# Patient Record
Sex: MALE | Race: WHITE
[De-identification: names, ages, dates, MRNs, and addresses within clinical notes are randomized per-mention and may not be internally consistent; named-entity substitution may affect disease eponyms.]

---

## 2019-01-20 NOTE — EDPHY
H & P


Stated Complaint: urinary retention


Time Seen by Provider: 01/20/19 21:51


HPI/ROS: 





HPI





CHIEF COMPLAINT:  Dysuria.





HISTORY OF PRESENT ILLNESS:  53-year-old male, history of bipolar disorder, 

alcoholism daily alcohol use, is been recently doing methamphetamine, presents 

to the emergency room stating he is having dysuria.  Patient states that he 

urinated earlier today and had a great deal of discomfort and burning.  He has 

then been holding his urine he was afraid use the bathroom.  He presents 

emergency room by ambulance for dysuria.  Denies any abdominal pain, denies 

back pain, denies fever, denies chest pain or shortness of breath.


He did do methamphetamine earlier today.


He also drank today.


Patient denies STI exposure.





Past Medical History:  Bipolar disorder not on medication





Past Surgical History:  No recent surgery





Social History:   methamphetamine, alcohol, daily alcohol use.  Homeless.





Family History:  Noncontributory








ROS   


REVIEW OF SYSTEMS:


10 Systems were reviewed and negative with the exception of the elements 

mentioned in the history of present illness.








Exam   


Constitutional nontoxic no acute distress  triage nursing summary reviewed, 

vital signs reviewed, awake/alert. 


Eyes   normal conjunctivae and sclera, EOMI, PERRLA. 


HENT   normal inspection, atraumatic, moist mucus membranes, no epistaxis, neck 

supple/ no meningismus, no raccoon eyes. 


Respiratory   clear to auscultation bilaterally, normal breath sounds, no 

respiratory distress, no wheezing. 


Cardiovascular   rate normal, regular rhythm, no murmur, no edema, distal 

pulses normal. 


Gastrointestinal   soft, non-tender, no rebound, no guarding, normal bowel 

sounds, no distension, no pulsatile mass. 


Genitourinary   no CVA tenderness. 


Musculoskeletal  no midline vertebral tenderness, full range of motion, no calf 

swelling, no tenderness of extremities, no meningismus, good pulses, 

neurovascularly intact.


Skin   pink, warm, & dry, no rash, skin atraumatic. 


Neurologic   awake, alert and oriented x 3, AAOx3, moves all 4 extremities 

equally, motor intact, sensory intact, CN II-XII intact, normal cerebellar, 

normal vision, normal speech. 


Psychiatric   normal mood/affect. 


Heme/Lymph/Immune   no lymphadenopathy.





Differential Diagnosis:  Includes but is not limited to in a particular order 

UTI, cystitis, alcohol intoxication, methamphetamine abuse, urinary retention, 

BPH, prostatitis





Medical Decision Making:  Plan for this patient IV establishment basic blood 

draw, IV fluid bolus, check UA.  Re-evaluate





Re-evaluation:





Urinalysis reviewed shows no evidence of infection.  No blood.


Patient re-evaluated at 1:21 a.m. He has been sleeping.  No complaints.  He 

denies any vomiting or abdominal pain chest pain or shortness of breath.





Blood work is reassuring.


Urinalysis reveals no infection





Patient well-hydrated.





Patient's drug screen positive for meth.





Highly recommend the patient refrain from doing methamphetamine.





Patient is able to urinate without any difficulty.





Return precautions discussed.








0133:  Patient screaming and yelling in the emergency room cursing, he is very 

upset that not of his personal belongings were brought with him from the 

shelter.  He is now very angry and threatening, verbally aggressive with staff.

  The police have been called for their assistance.


Source: Patient





- Personal History


Current Tetanus Diphtheria and Acellular Pertussis (TDAP): Unsure





- Medical/Surgical History


Hx Asthma: No


Hx Chronic Respiratory Disease: No


Hx Diabetes: No


Hx Cardiac Disease: No


Hx Renal Disease: No


Hx Cirrhosis: No


Hx Alcoholism: Yes


Hx HIV/AIDS: No


Hx Splenectomy or Spleen Trauma: No


Other PMH: back pain etoh use, meth use





- Social History


Smoking Status: Current every day smoker


Constitutional: 


 Initial Vital Signs











Temperature (C)  36.7 C   01/20/19 21:42


 


Heart Rate  101 H  01/20/19 21:42


 


Respiratory Rate  18   01/20/19 21:42


 


Blood Pressure  121/75 H  01/20/19 21:42


 


O2 Sat (%)  95   01/20/19 21:42








 











O2 Delivery Mode               Room Air














Allergies/Adverse Reactions: 


 





codeine Allergy (Verified 11/03/18 07:48)


 








Home Medications: 














 Medication  Instructions  Recorded


 


NK [No Known Home Meds]  10/30/18














Medical Decision Making





- Data Points


Laboratory Results: 


 Laboratory Results





 01/20/19 22:00 





 01/20/19 22:00 





 











  01/20/19 01/20/19 01/20/19





  22:00 22:00 21:40


 


WBC    11.73 10^3/uL H 10^3/uL  





    (3.80-9.50)  


 


RBC    4.46 10^6/uL 10^6/uL  





    (4.40-6.38)  


 


Hgb    15.3 g/dL g/dL  





    (13.7-17.5)  


 


Hct    43.3 % %  





    (40.0-51.0)  


 


MCV    97.1 fL fL  





    (81.5-99.8)  


 


MCH    34.3 pg H pg  





    (27.9-34.1)  


 


MCHC    35.3 g/dL g/dL  





    (32.4-36.7)  


 


RDW    11.9 % %  





    (11.5-15.2)  


 


Plt Count    318 10^3/uL 10^3/uL  





    (150-400)  


 


MPV    8.7 fL fL  





    (8.7-11.7)  


 


Neut % (Auto)    71.6 % %  





    (39.3-74.2)  


 


Lymph % (Auto)    16.5 % %  





    (15.0-45.0)  


 


Mono % (Auto)    11.2 % %  





    (4.5-13.0)  


 


Eos % (Auto)    0.1 % L %  





    (0.6-7.6)  


 


Baso % (Auto)    0.3 % %  





    (0.3-1.7)  


 


Nucleat RBC Rel Count    0.0 % %  





    (0.0-0.2)  


 


Absolute Neuts (auto)    8.40 10^3/uL H 10^3/uL  





    (1.70-6.50)  


 


Absolute Lymphs (auto)    1.94 10^3/uL 10^3/uL  





    (1.00-3.00)  


 


Absolute Monos (auto)    1.31 10^3/uL H 10^3/uL  





    (0.30-0.80)  


 


Absolute Eos (auto)    0.01 10^3/uL L 10^3/uL  





    (0.03-0.40)  


 


Absolute Basos (auto)    0.04 10^3/uL 10^3/uL  





    (0.02-0.10)  


 


Absolute Nucleated RBC    0.00 10^3/uL 10^3/uL  





    (0-0.01)  


 


Immature Gran %    0.3 % %  





    (0.0-1.1)  


 


Immature Gran #    0.03 10^3/uL 10^3/uL  





    (0.00-0.10)  


 


Sodium  129 mEq/L L mEq/L    





   (135-145)   


 


Potassium  4.1 mEq/L mEq/L    





   (3.5-5.2)   


 


Chloride  99 mEq/L mEq/L    





   ()   


 


Carbon Dioxide  21 mEq/l L mEq/l    





   (22-31)   


 


Anion Gap  9 mEq/L mEq/L    





   (6-14)   


 


BUN  14 mg/dL mg/dL    





   (7-23)   


 


Creatinine  0.6 mg/dL L mg/dL    





   (0.7-1.3)   


 


Estimated GFR  > 60     





    


 


Glucose  100 mg/dL mg/dL    





   ()   


 


Calcium  8.9 mg/dL mg/dL    





   (8.5-10.4)   


 


Urine Color      





    


 


Urine Appearance      





    


 


Urine pH      





    


 


Ur Specific Gravity      





    


 


Urine Protein      





    


 


Urine Ketones      





    


 


Urine Blood      





    


 


Urine Nitrate      





    


 


Urine Bilirubin      





    


 


Urine Urobilinogen      





    


 


Ur Leukocyte Esterase      





    


 


Urine Glucose      





    


 


Urine Opiates Screen      NEGATIVE 





     (NEGATIVE) 


 


Urine Barbiturates      NEGATIVE 





     (NEGATIVE) 


 


Ur Phencyclidine Scrn      NEGATIVE 





     (NEGATIVE) 


 


Ur Amphetamine Screen      NON-NEGATIVE  H 





     (NEGATIVE) 


 


U Benzodiazepines Scrn      NEGATIVE 





     (NEGATIVE) 


 


Urine Cocaine Screen      NEGATIVE 





     (NEGATIVE) 


 


U Marijuana (THC) Screen      NON-NEGATIVE  H 





     (NEGATIVE) 


 


Ethyl Alcohol  < 10 mg/dL mg/dL    





   (0-10)   














  01/20/19





  21:40


 


WBC  





  


 


RBC  





  


 


Hgb  





  


 


Hct  





  


 


MCV  





  


 


MCH  





  


 


MCHC  





  


 


RDW  





  


 


Plt Count  





  


 


MPV  





  


 


Neut % (Auto)  





  


 


Lymph % (Auto)  





  


 


Mono % (Auto)  





  


 


Eos % (Auto)  





  


 


Baso % (Auto)  





  


 


Nucleat RBC Rel Count  





  


 


Absolute Neuts (auto)  





  


 


Absolute Lymphs (auto)  





  


 


Absolute Monos (auto)  





  


 


Absolute Eos (auto)  





  


 


Absolute Basos (auto)  





  


 


Absolute Nucleated RBC  





  


 


Immature Gran %  





  


 


Immature Gran #  





  


 


Sodium  





  


 


Potassium  





  


 


Chloride  





  


 


Carbon Dioxide  





  


 


Anion Gap  





  


 


BUN  





  


 


Creatinine  





  


 


Estimated GFR  





  


 


Glucose  





  


 


Calcium  





  


 


Urine Color  YELLOW 





  


 


Urine Appearance  CLEAR 





  


 


Urine pH  5.0 





   (5.0-7.5) 


 


Ur Specific Gravity  1.011 





   (1.002-1.030) 


 


Urine Protein  NEGATIVE 





   (NEGATIVE) 


 


Urine Ketones  NEGATIVE 





   (NEGATIVE) 


 


Urine Blood  NEGATIVE 





   (NEGATIVE) 


 


Urine Nitrate  NEGATIVE 





   (NEGATIVE) 


 


Urine Bilirubin  NEGATIVE 





   (NEGATIVE) 


 


Urine Urobilinogen  NEGATIVE EU EU





   (0.2-1.0) 


 


Ur Leukocyte Esterase  NEGATIVE 





   (NEGATIVE) 


 


Urine Glucose  NEGATIVE 





   (NEGATIVE) 


 


Urine Opiates Screen  





  


 


Urine Barbiturates  





  


 


Ur Phencyclidine Scrn  





  


 


Ur Amphetamine Screen  





  


 


U Benzodiazepines Scrn  





  


 


Urine Cocaine Screen  





  


 


U Marijuana (THC) Screen  





  


 


Ethyl Alcohol  





  











Medications Given: 


 








Discontinued Medications





Sodium Chloride (Ns)  1,000 mls @ 0 mls/hr IV EDNOW ONE; [facility]


   PRN Reason: Protocol


   Stop: 01/20/19 21:56


   Last Admin: 01/20/19 21:59 Dose:  1,000 mls








Departure





- Departure


Disposition: Home, Routine, Self-Care


Clinical Impression: 


 Methamphetamine abuse





Condition: Good


Instructions:  Methamphetamine Abuse (ED)


Additional Instructions: 


1. Drink lots of fluids stay well-hydrated


2. Return emergency room if worsening symptoms


Referrals: 


NONE *PRIMARY CARE P,. [Primary Care Provider] - As per Instructions

## 2019-01-29 NOTE — EDPHY
H & P


Time Seen by Provider: 01/29/19 21:15


HPI/ROS: 


HPI:  This is a 53-year-old male who presents with





Chief Complaint:  Left-sided groin pain





Location:  Left-sided groin


Quality:  Pain, bulge


Duration:  1 hr prior to arrival


Signs and Symptoms: no fever, no nausea, no vomiting, no hematemesis, no blood 

in stool, no abdominal bloating, no diarrhea, no back pain, no urinary symptoms

, no testicular pain, + left-sided groin pain, no indigestion, no chest pain, 

no shortness of breath


Timing:  Acute, constant


Severity:  Moderate


Context:  Patient presents via EMS with complaints of sudden onset left-sided 

groin pain and bulge he reports that he has hernia that "came out when I was 

coughing."Patient is every day smoker, alcoholism and methamphetamine use.  

Patient was seen on 01/20/2019 complaining of urinary symptoms with negative 

urine.


Modifying Factors:  None





Comment: 








ROS:  A comprehensive 10 system review of systems is otherwise negative aside 

from elements mentioned in the history of present illness. 





MEDICAL/SURGICAL/SOCIAL HISTORY: 


Medical history: back pain, etoh use, meth use


Surgical history:  Denies


Social history:  Current everyday smoker.  Family history noncontributory.








CONSTITUTIONAL:  Labile, untidy, middle-aged male, awake and alert, no obvious 

distress


HEENT: Atraumatic and normocephalic, PERRL, EOMI.  Nares patent; no rhinorrhea;

  no nasal mucosal edema. Tympanic membranes clear. Oropharynx clear, poor 

dentition, no exudate and dry oral mucosa.  Airway patent.  No lymphadenopathy.

  No meningismus.


Cardiovascular: Normal S1/S2, regular rate, regular rhythm, without murmur rub 

or gallop.


PULMONARY/CHEST:  Symmetrical and nontender. Clear to auscultation bilaterally. 

Good air movement. No accessory muscle usage.


ABDOMEN:  Soft, nondistended, nontender, no rebound, no guarding, no peritoneal 

signs, no masses or organomegaly. No CVAT.


Male : circumcised penis, bilateral descended testes, no testicular swelling, 

small inguinal hernia mass appreciated on the left side, no penile discharge, 

no lesions, negative Prehn's sign. 


EXTREMITIES:  2/2 pulses, strength 5/5, no deformities, no clubbing, no 

cyanosis or edema.


NEUROLOGICAL: no focal neuro deficits.  GCS 15.


SKIN: Warm and dry, no erythema.  Tattoos.  no rash.  Good capillary refill. 








Source: Patient, Old records


Exam Limitations: No limitations





- Medical/Surgical History


Hx Asthma: No


Hx Chronic Respiratory Disease: No


Hx Diabetes: No


Hx Cardiac Disease: No


Hx Renal Disease: No


Hx Cirrhosis: No


Hx Alcoholism: Yes


Hx HIV/AIDS: No


Hx Splenectomy or Spleen Trauma: No


Other PMH: back pain etoh use, meth use





- Social History


Smoking Status: Current every day smoker


Constitutional: 


 Initial Vital Signs











Temperature (C)  36.5 C   01/29/19 21:16


 


Heart Rate  106 H  01/29/19 21:16


 


Respiratory Rate  20   01/29/19 21:16


 


Blood Pressure  109/69   01/29/19 21:16


 


O2 Sat (%)  93   01/29/19 21:16








 











O2 Delivery Mode               Room Air














Allergies/Adverse Reactions: 


 





codeine Allergy (Verified 01/29/19 21:16)


 








Home Medications: 














 Medication  Instructions  Recorded


 


NK [No Known Home Meds]  10/30/18














Medical Decision Making


Procedures: 


Procedure:  Hernia reduction.





The left inguinal hernia was reduced using gentle pressure technique without 

complications.  Post reduction the patient's abdominal, groin exam was normal.


The procedure was performed by myself.





ED Course/Re-evaluation: 


Vital signs reviewed and show mild tachycardia.


Patient immediately placed into Trendelenburg position and IV fentanyl 50 mcg 

given.


Left inguinal hernia simple type was easily reduced on 1st attempt.


Written and verbal instructions on self reduction and prevention of hernia 

incarceration.





No signs of neurovascular compromise/tenting of skin/compartment syndrome/

extremities and joints examined above and below area of concern and are 

neurovascularly intact.








This patient was seen under the supervision of my secondary supervising 

physician.  Discussed this patient with Dr. Rouse.   





Differential Diagnosis: 


Differential diagnosis includes but is not limited to incarcerated hernia, 

strangulated hernia, abscess, gonorrhea, chlamydia, urinary tract infection, 

folliculitis.








- Data Points


Medications Given: 


 








Discontinued Medications





Fentanyl (Sublimaze)  50 mcg IVP EDNOW ONE


   Stop: 01/29/19 21:17


   Last Admin: 01/29/19 21:22 Dose:  50 mcg








Departure





- Departure


Disposition: Home, Routine, Self-Care


Clinical Impression: 


 Reducible left inguinal hernia





Condition: Good


Instructions:  Inguinal Hernia (ED), Inguinal Hernia Repair (DC)


Additional Instructions: 


Please do not lift more than 10 lb.


Take Tylenol 650 mg every 4 hours and/or Ibuprofen 600 mg every 8 hours with 

food as needed for pain. 


Attempt to reduce hernia by yourself by lying in Trendelenburg position and 

applying gentle pressure. 


Follow up with the [facility] and/or General surgery to discuss further 

options of hernia repair.


Referrals: 


[facility],. [Clinic] - As per Instructions

## 2019-03-14 ENCOUNTER — HOSPITAL ENCOUNTER (OUTPATIENT)
Dept: HOSPITAL 80 - F3N | Age: 54
Setting detail: OBSERVATION
LOS: 1 days | Discharge: HOME | End: 2019-03-15
Attending: SURGERY | Admitting: SURGERY
Payer: MEDICAID

## 2019-03-14 DIAGNOSIS — Z72.0: ICD-10-CM

## 2019-03-14 DIAGNOSIS — F32.9: ICD-10-CM

## 2019-03-14 DIAGNOSIS — K42.9: ICD-10-CM

## 2019-03-14 DIAGNOSIS — K40.20: Primary | ICD-10-CM

## 2019-03-14 DIAGNOSIS — Z23: ICD-10-CM

## 2019-03-14 PROCEDURE — G0378 HOSPITAL OBSERVATION PER HR: HCPCS

## 2019-03-14 PROCEDURE — 0WQF0ZZ REPAIR ABDOMINAL WALL, OPEN APPROACH: ICD-10-PCS | Performed by: SURGERY

## 2019-03-14 PROCEDURE — G0009 ADMIN PNEUMOCOCCAL VACCINE: HCPCS

## 2019-03-14 PROCEDURE — 90471 IMMUNIZATION ADMIN: CPT

## 2019-03-14 PROCEDURE — 49650 LAP ING HERNIA REPAIR INIT: CPT

## 2019-03-14 PROCEDURE — C1781 MESH (IMPLANTABLE): HCPCS

## 2019-03-14 PROCEDURE — C1727 CATH, BAL TIS DIS, NON-VAS: HCPCS

## 2019-03-14 PROCEDURE — 49585: CPT

## 2019-03-14 PROCEDURE — 0WQF4ZZ REPAIR ABDOMINAL WALL, PERCUTANEOUS ENDOSCOPIC APPROACH: ICD-10-PCS | Performed by: SURGERY

## 2019-03-14 RX ADMIN — HYDROCODONE BITARTRATE AND ACETAMINOPHEN PRN TAB: 5; 325 TABLET ORAL at 22:15

## 2019-03-14 RX ADMIN — HYDROCODONE BITARTRATE AND ACETAMINOPHEN PRN TAB: 5; 325 TABLET ORAL at 16:17

## 2019-03-14 RX ADMIN — KETOROLAC TROMETHAMINE SCH MG: 15 INJECTION, SOLUTION INTRAMUSCULAR; INTRAVENOUS at 17:53

## 2019-03-14 NOTE — SUROPNOTE
[facility] Operative Report





- Surgery





Date of surgery:  3/14/2019





Indications for surgery:


This is a 53-year-old gentleman with increasing large left inguinal hernia 

asymptomatic right and a mildly symptomatic umbilical hernia here for elective 

repair.





Preop diagnosis:  Bilateral inguinal hernia, umbilical hernia


Postop diagnosis:  Same


Procedure:  Bilateral laparoscopic inguinal hernia repair with 3DMax mesh, open 

umbilical hernia repair





Surgeon:  Dr. Foote


Assistant:  Kirti Orosco PA-C 





Anesthesiologist:  Dr. Heller:  General endotracheal anesthesia





Specimens:  None


Fluids Given:  500 cc crystalloid


EBL:  10 mL





Procedure:


The patient was brought to the operating room after induction of endotracheal 

anesthesia in supine position abdomen is prepped chlorhexidine and draped 

sterilely time-out procedure was then performed according institutional 

standards.  Local anesthetic is then infused in skin subcutaneous tissues of 

the umbilicus as well as a field block bilaterally.





Open preperitoneal space was entered in created in the standard fashion.  The 

incision made infraumbilically deepened with electrocautery the transverse 

incision was made on the left anterior rectus sheath.  The muscle was swept 

laterally and a balloon dissector and then a structural balloon were used to 

create maintain the space.  Working trocars were then placed in the lower 

midline under direct visualization.  The pubic tubercle was identified and 

dissection was carried on the left from the anterior superior iliac spine to 

pubic tubercle a similar dissection was done on the right.  A large left 

inguinal hernia with a large lipoma cord were reduced from the left side and a 

smaller lipoma sac were then removed from the right side.





3DMax mesh was then unfurled between the hernia defect and the peritoneum on 

either side.  Care was taken that it was adhered to the my attending


And overlapped in the midline.  The areas deflated and the fascia was 

reapproximated using 0 Vicryl at the level of fascia.  The same incision was 

then used to elevate the umbilicus and a 6 mm defect with incarcerated fat was 

found within the hernia defect fat was amputated as it was not able to go 

through the small defect.  Hemostasis was assured needle instrument and sponges 

were counted for and the hernia defect was closed using 0 Vicryl suture.  After 

tension-free primary repair the umbilicus was tacked down to the fascia and 

closure of the incisions was done with 4 Monocryl.  Dermabond was applied.  The 

patient was awakened extubated taken to recovery in stable condition no 

immediate complications.

## 2019-03-14 NOTE — PDANEPAE
ANE History of Present Illness





Ing hernia





ANE Past Medical History





- Cardiovascular History


Hx Hypertension: No





- Pulmonary History


Hx COPD: No


Hx Oxygen in Use at Home: No


Hx Sleep Apnea: No


Sleep Apnea Screening Result - Last Documented: Negative





- Endocrine History


Hx Diabetes: No





- Neurological & Psychiatric Hx


Neurological / Psychiatric History Comment: Depression





- Other Health History


Other Health History: Hx ETOH and Meth abuse. [place] clean for 20 days.





ANE Review of Systems


Review of Systems: 








ANE Patient History





- Allergies


Allergies/Adverse Reactions: 








codeine Allergy (Verified 01/29/19 21:16)


 








- Home Medications


Home Medications: 








NK [No Known Home Meds]  10/30/18 [Last Taken Unknown]








- NPO status


NPO Since - Liquids (Date): 03/14/19


NPO Since - Liquids (Time): 10:00


NPO Since - Solids (Date): 03/13/19


NPO Since - Solids (Time): 11:55





- Anes Hx


Anes Hx: no prior problems (Dental only. No GA.)





- Smoking Hx


Smoking Status: Current every day smoker





ANE Labs/Vital Signs





- Vital Signs


Blood Pressure: 130/86


Heart Rate: 74


Respiratory Rate: 14


O2 Sat (%): 95


Height: 180.34 cm


Weight: 72.575 kg





ANE Physical Exam





- Airway


Neck exam: FROM


Mallampati Score: Class 2


Mouth exam: normal dental/mouth exam





- Pulmonary


Pulmonary: no respiratory distress





- Cardiovascular


Cardiovascular: regular rate and rhythym





- ASA Status


ASA Status: II





ANE Anesthesia Plan


Anesthesia Plan: general endotracheal anesthesia

## 2019-03-14 NOTE — ASMTCMCOM
CM Note

 

CM Note                       

Notes:

Chart reviewed for discharge planning purposes. 53 year old male admitted s/p hernia repair 

surgery, M to follow for needs.

Plan: TBD

 

Date Signed:  03/14/2019 04:36 PM

Electronically Signed By:Eleanor Interiano RN

## 2019-03-14 NOTE — POSTOPPROG
Post Op Note


Date of Operation: 03/14/19


Surgeon: Tejas Foote


Assistant: MAYELIN Orosco PA-C


Anesthesiologist: Pina


Anesthesia: GET(General Endotracheal)


Pre-op Diagnosis: B/L IH and umbilical hernia


Post-op Diagnosis: same


Procedure: Bilateral Lap TEP IH repair with 3dmax mesh, primary umbilical 

hernia rep


Findings: as above


Inf/Abcess present in the surg proc area at time of surgery?: No


Complications: 





none


Bowel Protocol: N/A


Clean Closure Performed: N/A


Specimen(s): 





none

## 2019-03-15 VITALS — DIASTOLIC BLOOD PRESSURE: 81 MMHG | SYSTOLIC BLOOD PRESSURE: 127 MMHG

## 2019-03-15 RX ADMIN — KETOROLAC TROMETHAMINE SCH MG: 15 INJECTION, SOLUTION INTRAMUSCULAR; INTRAVENOUS at 00:19

## 2019-03-15 RX ADMIN — KETOROLAC TROMETHAMINE SCH MG: 15 INJECTION, SOLUTION INTRAMUSCULAR; INTRAVENOUS at 06:28

## 2019-03-15 NOTE — ASMTLACE
LACE

 

Length of stay for            Answers:  Less than 1 day                       

current admission                                                             

Acuity / Level of             Answers:  No                                    

Care: Did the patient                                                         

have an inpatient                                                             

admission?                                                                    

# of Emergency department     Answers:  3-4                                   

visits in the last 6                                                          

months                                                                        

Social determinants           Answers:  History of substance                  

                                        abuse (ETOH, street                   

                                        drugs, prescription                   

                                        drugs, etc.)                          

                                        Homelessness                          

                                        (street, shelter)                     

                                        Mental health diagnosis               

                                        (anxiety, depression, pers            

                                        onality disorders, etc.)              

Score: 12

 

Date Signed:  03/15/2019 10:02 AM

Electronically Signed By:ZACKARY Parson

## 2019-03-15 NOTE — SOAPPROG
SOAP Progress Note


Assessment/Plan: 


Assessment/Plan:





Doing well postop day1


Tolerating po


In shower unable to examine.


AVSS





D/w nursing


No issues o/n


F/u as schedule in office post op instructions provided


To resp[facility] care ([facility])





03/15/19 16:56





Objective: 





 Vital Signs











Temp Pulse Resp BP Pulse Ox


 


 36.7 C   83   18   127/81 H  96 


 


 03/15/19 07:26  03/15/19 07:26  03/15/19 07:26  03/15/19 07:26  03/15/19 07:26








 











 03/14/19 03/15/19 03/16/19





 05:59 05:59 05:59


 


Intake Total  1150 


 


Output Total  270 


 


Balance  880 














ICD10 Worksheet


Patient Problems: 


 Problems











Problem Status Onset


 


Inguinal hernia bilateral, non-recurrent Acute  


 


Umbilical hernia Acute  














- ICD10 Problem Qualifiers


(1) Inguinal hernia bilateral, non-recurrent








(2) Umbilical hernia

## 2019-03-15 NOTE — ASDISCHSUM
----------------------------------------------

Discharge Information

----------------------------------------------

Plan Status:Home with No Needs                       Medically Cleared to Leave:

Discharge Date:                                       D/C Disposition:Home, Routine, Self-Care

ADT D/C Disposition:Home, Routine, Self-Care         Projected Discharge Date:03/15/2019 12:00 AM

Transportation at D/C:                               Discharge Delay Reason:

Follow-Up Date:03/15/2019 12:00 AM                   Discharge Slot:

Final Diagnosis:

----------------------------------------------

Placement Information

----------------------------------------------

----------------------------------------------

Patient Contact Information

----------------------------------------------

Contact Name:VALARIE                          Relationship:

Address:                                             Home [phone]

                                                     Work Phone:

City:                                                [facility] Phone:

State/[place] Code:                                      Email:

----------------------------------------------

Financial Information

----------------------------------------------

Financial Class:Medicaid

Primary Plan Desc[place] [place]        Primary Plan Number:[id number]

Secondary Plan Desc:                                 Secondary Plan Number:

 

 

----------------------------------------------

Assessment Information

----------------------------------------------

----------------------------------------------

CM  Assessment

----------------------------------------------

CM Note

 

CM Note                       

Notes:

CM  was alerted by Melissa Perdomo, Manager of Pre-Op, that this patient is to undergo 

a hernia repair on 3/14 with Dr. Hinton. Dr. Foote's team wanted case management to be aware 

that this patient is currently homeless and will need a place to stay after his procedure. 



Addison is a [facility] client with a confirmed bed, however, the [facility] does not allow 

clients to stay indoors during day time hours. For a safe discharge plan, the Case Management 

Director, Lorene Lai, has approved a 1-night hotel respite stay at the [facility]. I have 


informed the floor  that this will need to be arranged day of discharge. 



Addison is an active outpatient with [facility] and should follow-up post-procedure as directed. 



Case Management to follow.

 

Date Signed:  03/14/2019 02:42 PM

Electronically Signed By:Virginia Martinez

 

 

----------------------------------------------

LACE

----------------------------------------------

LACE

 

Length of stay for            Answers:  Less than 1 day                       

current admission                                                             

Acuity / Level of             Answers:  No                                    

Care: Did the patient                                                         

have an inpatient                                                             

admission?                                                                    

# of Emergency department     Answers:  3-4                                   

visits in the last 6                                                          

months                                                                        

Social determinants           Answers:  History of substance                  

                                        abuse (ETOH, street                   

                                        drugs, prescription                   

                                        drugs, etc.)                          

                                        Homelessness                          

                                        (street, shelter)                     

                                        Mental health diagnosis               

                                        (anxiety, depression, pers            

                                        onality disorders, etc.)              

Score: 12

 

Date Signed:  03/15/2019 10:02 AM

Electronically Signed By:ZACKARY Parson

 

 

----------------------------------------------

[facility] Progress Note

----------------------------------------------

CM Note

 

CM Note                       

Notes:

Chart reviewed for discharge planning purposes. 53 year old male admitted s/p hernia repair 

surgery, M to follow for needs.

Plan: TBD

 

Date Signed:  03/14/2019 04:36 PM

Electronically Signed By:Eleanor Interiano RN

 

 

----------------------------------------------

Case Management Discharge Plan Note

----------------------------------------------

Case Management Discharge

 

Discharge Order Complete?     Answers:  Yes                                   

Patient to Obtain             Answers:  Other                         Notes:  [employer]

Medications                                                                   

Transportation Arranged       Answers:  Taxi - Voucher                        

Transport will Pick (Date     03/15/2019 11:45 AM

& Time)                       

Case Management Transport     Answers:  Yes                                   

Form Complete                                                                 

Discharge Comments            

Notes:

Pt is getting discharged [facility] one night as previously approved of before 

surgery. EDSON scheduled taxi transportation. CM discussed discharge plan with pt, he requested 

assistance scheduling PCP appt, CM scheduled appt and reminded pt of [facility] surgery follow-up 

appt. Pt reported no other concerns. 

Cab scheduled for 11:45am. 

 

Date Signed:  03/15/2019 10:01 AM

Electronically Signed By:ZACKARY Parson

 

 

----------------------------------------------

Intervention Information

----------------------------------------------

## 2019-03-15 NOTE — ASMTDCNOTE
Case Management Discharge

 

Discharge Order Complete?     Answers:  Yes                                   

Patient to Obtain             Answers:  Other                         Notes:  [employer]

Medications                                                                   

Transportation Arranged       Answers:  Taxi - Voucher                        

Transport will Pick (Date     03/15/2019 11:45 AM

& Time)                       

Case Management Transport     Answers:  Yes                                   

Form Complete                                                                 

Discharge Comments            

Notes:

Pt is getting discharged to [facility] for one night as previously approved of before 

surgery. CM scheduled taxi transportation. CM discussed discharge plan with pt, he requested 

assistance scheduling PCP appt, CM scheduled appt and reminded pt of [place] surgery follow-up 

appt. Pt reported no other concerns. 

Cab scheduled for 11:45am. 

 

Date Signed:  03/15/2019 10:01 AM

Electronically Signed By:ZACKARY Parson

## 2019-06-22 ENCOUNTER — HOSPITAL ENCOUNTER (OUTPATIENT)
Dept: HOSPITAL 80 - FED | Age: 54
Setting detail: OBSERVATION
LOS: 2 days | Discharge: HOME | End: 2019-06-24
Payer: MEDICAID